# Patient Record
Sex: MALE | Race: AMERICAN INDIAN OR ALASKA NATIVE | HISPANIC OR LATINO | Employment: OTHER | URBAN - METROPOLITAN AREA
[De-identification: names, ages, dates, MRNs, and addresses within clinical notes are randomized per-mention and may not be internally consistent; named-entity substitution may affect disease eponyms.]

---

## 2023-03-06 ENCOUNTER — TELEPHONE (OUTPATIENT)
Dept: HEPATOLOGY | Facility: CLINIC | Age: 43
End: 2023-03-06
Payer: OTHER GOVERNMENT

## 2023-03-06 NOTE — TELEPHONE ENCOUNTER
Attempted to contact patient at [phone] with no answer. Left voicemail message for patient to return call.

## 2023-03-06 NOTE — TELEPHONE ENCOUNTER
----- Message from Rosie Almaguer sent at 3/6/2023  3:12 PM CST -----  Contact: [phone]  Type:  Patient Returning Call    Who Called:Jesus Manuel  Who Left Message for Patient:Kimberly  Does the patient know what this is regarding?:Appointment  Would the patient rather a call back or a response via [facility]? Call back  Best Call Back Number:[phone]  Additional Information:

## 2023-04-05 ENCOUNTER — OFFICE VISIT (OUTPATIENT)
Dept: HEPATOLOGY | Facility: CLINIC | Age: 43
End: 2023-04-05
Payer: OTHER GOVERNMENT

## 2023-04-05 VITALS
HEIGHT: 66 IN | HEART RATE: 69 BPM | DIASTOLIC BLOOD PRESSURE: 70 MMHG | BODY MASS INDEX: 27.8 KG/M2 | SYSTOLIC BLOOD PRESSURE: 112 MMHG | WEIGHT: 173 LBS

## 2023-04-05 DIAGNOSIS — I72.8 HEPATIC ARTERY ANEURYSM: Primary | ICD-10-CM

## 2023-04-05 DIAGNOSIS — R79.89 ABNORMAL LFTS: ICD-10-CM

## 2023-04-05 DIAGNOSIS — K75.0 HEPATIC ABSCESS: ICD-10-CM

## 2023-04-05 PROCEDURE — 99999 PR PBB SHADOW E&M-EST. PATIENT-LVL III: CPT | Mod: PBBFAC,,, | Performed by: INTERNAL MEDICINE

## 2023-04-05 PROCEDURE — 99213 OFFICE O/P EST LOW 20 MIN: CPT | Mod: PBBFAC | Performed by: INTERNAL MEDICINE

## 2023-04-05 PROCEDURE — 99204 PR OFFICE/OUTPT VISIT, NEW, LEVL IV, 45-59 MIN: ICD-10-PCS | Mod: S$PBB,,, | Performed by: INTERNAL MEDICINE

## 2023-04-05 PROCEDURE — 99999 PR PBB SHADOW E&M-EST. PATIENT-LVL III: ICD-10-PCS | Mod: PBBFAC,,, | Performed by: INTERNAL MEDICINE

## 2023-04-05 PROCEDURE — 99204 OFFICE O/P NEW MOD 45 MIN: CPT | Mod: S$PBB,,, | Performed by: INTERNAL MEDICINE

## 2023-04-05 NOTE — PROGRESS NOTES
Subjective:     Jesus Manuel Villareal is here for evaluation of Liver Abcess       HPI  Jesus Manuel Villareal is here for evaluation of abnormal LFTs and recent complications after biopsy.  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  These issues were managed with embolization as well as drainage and antibiotics.  He is currently off all antibiotics.  He reports feeling well.  Denies any major issues.  Only occasional, limited episodic discomfort on the right flank with certain movements.  No issues of fevers or chills.    Outside records reviewed     Path 1/2023  A.  Liver, needle biopsy:   -No histologic abnormality     COMMENT: Sections show multiple cores of benign liver parenchyma measuring in aggregate approximately 38 mm. The lobular architecture is preserved. Portal areas are seen in all cores.   -IRON stain shows no increased ferritin deposition.   -TRICHROME stain shows normal connective tissue with no increased fibrosis.   -RETICULUM stain highlights a normal two cell plate architecture.   -PAS stain shows portal areas to have normal bile ducts and vessels with mild chronic inflammation consisting of small lymphocytes in some portal areas. Other portal areas have only rare lymphocytes. There is no active inflammation or piecemeal necrosis.   -PAS-D shows no evidence of cytoplasmic inclusions.     CT 1/2023  IMPRESSION:   Abnormal appearance of the anterior superior left lobe of the liver,   marked focal fatty infiltration versus traumatic contusion, with a   small amount of high attenuation fluid perihepatic and pelvic,   consistent with minimal hemoperitoneum.  Recommend clinical   correlation.  Consider CT scan with contrast.      Embolization 1/2023  IMPRESSION:   1. Successful coil embolization of an acute vessel injury, likely a   pseudoaneurysm, of the left hepatic artery segment 3 branch.   2. There are tiny bleeding foci from multiple tiny left  hepatic artery   branches.  These are likely related to active dissection associated   with an enlarging hematoma.  They were left untreated directly, as   treating them would necessitate sacrificing the left hepatic lobe.   They should tamponade now that the primary bleeding focus has been   treated.    CT 2/2023  Decreased size of the intrahepatic abscess after drainage catheter placement.    Review of Systems    Objective:     Physical Exam    Computed MELD-Na score unavailable. Necessary lab results were not found in the last year.  Computed MELD score unavailable. Necessary lab results were not found in the last year.    WBC   Date Value Ref Range Status   01/31/2023 10.9 (H) 4.8 - 10.8 K/UL Final     Hemoglobin   Date Value Ref Range Status   01/31/2023 10.4 (L) 14.0 - 17.0 g/dL Final     Hct   Date Value Ref Range Status   01/31/2023 35.5 (L) 41.0 - 50.0 % Final     Platelets   Date Value Ref Range Status   01/31/2023 625 (H) 150 - 400 K/UL Final     BUN   Date Value Ref Range Status   10/04/2022 11 7 - 18 mg/dL Final     Creatinine   Date Value Ref Range Status   10/04/2022 0.83 0.70 - 1.30 mg/dL Final     Calcium   Date Value Ref Range Status   10/04/2022 8.9 8.5 - 10.1 mg/dL Final     Sodium   Date Value Ref Range Status   10/04/2022 138 136 - 145 mmol/L Final     Potassium   Date Value Ref Range Status   10/04/2022 3.9 3.5 - 5.1 mmol/L Final     Chloride   Date Value Ref Range Status   10/04/2022 105 98 - 107 mmol/L Final     Aspartate Aminotransferase   Date Value Ref Range Status   01/22/2023 40 (H) 15 - 37 IU/L Final     Alanine Aminotransferase   Date Value Ref Range Status   01/22/2023 71 (H) 16 - 61 IU/L Final     Alk Phos   Date Value Ref Range Status   01/22/2023 118 (H) 45 - 117 IU/L Final     Albumin Level   Date Value Ref Range Status   01/22/2023 3.2 (L) 3.4 - 5.0 g/dL Final     INR   Date Value Ref Range Status   01/20/2023 1.30 <5.00 INR Final     Comment:     INR Therapeutic Ranges    Standard Dose Therapy 2.0-3.0  High Dose Therapy 2.5-3.5         Assessment/Plan:     1. Hepatic artery aneurysm    2. Hepatic abscess    3. Abnormal LFTs      Jesus Manuel Villareal is a 43 y.o. male withLiver Abcess     Hepatic artery aneurysm-seems stabilized at this point.  Would like to repeat imaging in a few months to re-evaluate.  -     Comprehensive Metabolic Panel; Future; Expected date: 04/05/2023  -     CBC Auto Differential; Future; Expected date: 04/05/2023  -     CT Abdomen With Without Contrast; Future; Expected date: 04/05/2023    Hepatic abscess-seems resolved with completed antibiotic course.  -     Comprehensive Metabolic Panel; Future; Expected date: 04/05/2023  -     CBC Auto Differential; Future; Expected date: 04/05/2023  -     CT Abdomen With Without Contrast; Future; Expected date: 04/05/2023    Abnormal LFTs-will need to monitor.  Patient recently had biopsy for abnormal LFTs; pathology was unremarkable.  May be difficult to assess LFTs in the setting of recent bleeding and abscesses.  Will let things continue to settle down and recover and monitor labs.  Prior to the biopsy there was no evidence of chronic underlying liver disease. Given the issue with the hepatic artery will need to monitor for any biliary complications.    -     Gamma GT; Future; Expected date: 04/05/2023  -     Comprehensive Metabolic Panel; Future; Expected date: 04/05/2023  -     CBC Auto Differential; Future; Expected date: 04/05/2023  -     Hepatitis B Surface Ab, Qualitative; Future; Expected date: 04/05/2023  -     Hepatitis B Surface Antigen; Future; Expected date: 04/05/2023  -     Hepatitis A antibody, IgG; Future; Expected date: 04/05/2023  -     CT Abdomen With Without Contrast; Future; Expected date: 04/05/2023  -     Hepatitis C Antibody; Future; Expected date: 04/05/2023    Return to clinic in 3 months with preclinic labs and imaging      Tiffany Márquez MD

## 2023-08-02 ENCOUNTER — OFFICE VISIT (OUTPATIENT)
Dept: FAMILY MEDICINE | Facility: CLINIC | Age: 43
End: 2023-08-02
Payer: OTHER GOVERNMENT

## 2023-08-02 VITALS
WEIGHT: 168.13 LBS | HEART RATE: 99 BPM | TEMPERATURE: 99 F | BODY MASS INDEX: 27.02 KG/M2 | HEIGHT: 66 IN | DIASTOLIC BLOOD PRESSURE: 68 MMHG | OXYGEN SATURATION: 99 % | SYSTOLIC BLOOD PRESSURE: 110 MMHG

## 2023-08-02 DIAGNOSIS — Z13.220 SCREENING FOR LIPID DISORDERS: ICD-10-CM

## 2023-08-02 DIAGNOSIS — Z00.00 WELLNESS EXAMINATION: Primary | ICD-10-CM

## 2023-08-02 DIAGNOSIS — Z78.9 NONSMOKER: ICD-10-CM

## 2023-08-02 DIAGNOSIS — R79.89 ABNORMAL LFTS: ICD-10-CM

## 2023-08-02 DIAGNOSIS — Z13.31 NEGATIVE DEPRESSION SCREENING: ICD-10-CM

## 2023-08-02 DIAGNOSIS — Z13.1 SCREENING FOR DIABETES MELLITUS: ICD-10-CM

## 2023-08-02 PROBLEM — Z22.7 LATENT TUBERCULOSIS: Status: ACTIVE | Noted: 2023-08-02

## 2023-08-02 PROBLEM — H52.229 REGULAR ASTIGMATISM: Status: ACTIVE | Noted: 2023-08-02

## 2023-08-02 PROBLEM — I72.4 PSEUDOANEURYSM OF FEMORAL ARTERY: Status: ACTIVE | Noted: 2023-02-10

## 2023-08-02 PROBLEM — H81.10 BENIGN PAROXYSMAL VERTIGO, UNSPECIFIED EAR: Status: ACTIVE | Noted: 2023-08-02

## 2023-08-02 PROBLEM — M20.012 MALLET FINGER OF LEFT FINGER(S): Status: ACTIVE | Noted: 2019-04-18

## 2023-08-02 PROCEDURE — 99202 OFFICE O/P NEW SF 15 MIN: CPT | Mod: S$PBB,25,, | Performed by: FAMILY MEDICINE

## 2023-08-02 PROCEDURE — 99386 PREV VISIT NEW AGE 40-64: CPT | Mod: S$PBB,,, | Performed by: FAMILY MEDICINE

## 2023-08-02 PROCEDURE — 99999 PR PBB SHADOW E&M-EST. PATIENT-LVL III: ICD-10-PCS | Mod: PBBFAC,,, | Performed by: FAMILY MEDICINE

## 2023-08-02 PROCEDURE — 99202 PR OFFICE/OUTPT VISIT, NEW, LEVL II, 15-29 MIN: ICD-10-PCS | Mod: S$PBB,25,, | Performed by: FAMILY MEDICINE

## 2023-08-02 PROCEDURE — 99213 OFFICE O/P EST LOW 20 MIN: CPT | Mod: PBBFAC,PN | Performed by: FAMILY MEDICINE

## 2023-08-02 PROCEDURE — 99999 PR PBB SHADOW E&M-EST. PATIENT-LVL III: CPT | Mod: PBBFAC,,, | Performed by: FAMILY MEDICINE

## 2023-08-02 PROCEDURE — 99386 PR PREVENTIVE VISIT,NEW,40-64: ICD-10-PCS | Mod: S$PBB,,, | Performed by: FAMILY MEDICINE

## 2023-08-02 RX ORDER — ALBUTEROL SULFATE 90 UG/1
AEROSOL, METERED RESPIRATORY (INHALATION)
COMMUNITY
Start: 2023-02-07 | End: 2023-10-27

## 2023-08-02 NOTE — PROGRESS NOTES
Subjective     Patient ID: Jesus Manuel Villareal is a 43 y.o. male.    Chief Complaint: Establish Care    Wellness exam and to establish care    Patient was recently active in the .  Has since retired from the .  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  Patient was eventually cleared by General surgery to return to see them on a p.r.n. basis.  He also followed 1 time with hepatology.  Repeat labs and CT was ordered, but patient has not gotten them because he switched insurances within [facility].  Patient here to figure out what he needs to do next.          Review of Systems   Constitutional:  Negative for activity change, appetite change, fatigue and fever.   Respiratory:  Negative for cough, chest tightness, shortness of breath and wheezing.    Cardiovascular:  Negative for chest pain, palpitations, leg swelling and claudication.   Gastrointestinal:  Negative for abdominal pain, constipation and diarrhea.   Psychiatric/Behavioral:  Negative for dysphoric mood, sleep disturbance and suicidal ideas. The patient is not nervous/anxious.           Objective     Physical Exam  Vitals reviewed.   Constitutional:       General: He is not in acute distress.     Appearance: Normal appearance. He is not ill-appearing.   Cardiovascular:      Rate and Rhythm: Normal rate and regular rhythm.      Heart sounds: Normal heart sounds.   Pulmonary:      Effort: Pulmonary effort is normal. No respiratory distress.      Breath sounds: Normal breath sounds.   Abdominal:      General: Bowel sounds are normal.      Palpations: Abdomen is soft.      Tenderness: There is no abdominal tenderness.   Musculoskeletal:         General: No swelling or tenderness.  Normal range of motion.      Cervical back: Neck supple.      Right lower leg: No edema.      Left lower leg: No edema.   Neurological:      General: No focal deficit present.      Mental Status: He is alert and oriented to person, place, and time.   Psychiatric:         Mood and Affect: Mood normal.         Behavior: Behavior normal.         Thought Content: Thought content normal.            Assessment and Plan     1. Wellness examination    2. BMI 27.0-27.9,adult  -     Lipid Panel; Future; Expected date: 08/02/2023  -     Hemoglobin A1C; Future; Expected date: 08/02/2023    3. Screening for lipid disorders  -     Lipid Panel; Future; Expected date: 08/02/2023    4. Screening for diabetes mellitus  -     Hemoglobin A1C; Future; Expected date: 08/02/2023    5. Negative depression screening      Advised patient to schedule CT scan in labs ordered by hepatology.  Advised patient to follow with them to complete workup.  Advised if labs are still elevated and can not be explained from a GI perspective to return to clinic here and we will continue the workup.  We will add on screening labs for patient to obtain when he gets his other labs done.  Risks, benefits, and side effects were discussed with the patient. All questions were answered to the fullest satisfaction of the patient, and pt verbalized understanding and agreement to treatment plan. Pt was to call with any new or worsening symptoms, or present to the ER.         Berta Cedeno MD  Family Medicine Physician   [facility]     This note was created using [employer] voice recognition software that occasionally may misinterpret phrases or words.

## 2023-08-02 NOTE — PATIENT INSTRUCTIONS
[facility] in [place], [place]  Address: [address], [place] [address]  Phone: [phone]    Need to schedule your Ct scan ordered by Dr. Sanchez and labs ordered by Dr. Sanchez and Dr. Cedeno

## 2023-08-21 ENCOUNTER — PATIENT MESSAGE (OUTPATIENT)
Dept: FAMILY MEDICINE | Facility: CLINIC | Age: 43
End: 2023-08-21
Payer: OTHER GOVERNMENT

## 2023-08-21 ENCOUNTER — HOSPITAL ENCOUNTER (OUTPATIENT)
Dept: RADIOLOGY | Facility: HOSPITAL | Age: 43
Discharge: HOME | End: 2023-08-21
Attending: INTERNAL MEDICINE
Payer: OTHER GOVERNMENT

## 2023-08-21 ENCOUNTER — PATIENT MESSAGE (OUTPATIENT)
Dept: HEPATOLOGY | Facility: CLINIC | Age: 43
End: 2023-08-21
Payer: OTHER GOVERNMENT

## 2023-08-21 ENCOUNTER — LAB VISIT (OUTPATIENT)
Dept: LAB | Facility: HOSPITAL | Age: 43
End: 2023-08-21
Attending: FAMILY MEDICINE
Payer: OTHER GOVERNMENT

## 2023-08-21 DIAGNOSIS — R79.89 ABNORMAL LFTS: ICD-10-CM

## 2023-08-21 DIAGNOSIS — I72.8 HEPATIC ARTERY ANEURYSM: ICD-10-CM

## 2023-08-21 DIAGNOSIS — K75.0 HEPATIC ABSCESS: ICD-10-CM

## 2023-08-21 DIAGNOSIS — Z13.1 SCREENING FOR DIABETES MELLITUS: ICD-10-CM

## 2023-08-21 DIAGNOSIS — Z13.220 SCREENING FOR LIPID DISORDERS: ICD-10-CM

## 2023-08-21 LAB
CHOLEST SERPL-MCNC: 181 MG/DL (ref 120–199)
CHOLEST/HDLC SERPL: 6.7 {RATIO} (ref 2–5)
ESTIMATED AVG GLUCOSE: 117 MG/DL (ref 68–131)
HBA1C MFR BLD: 5.7 % (ref 4–5.6)
HDLC SERPL-MCNC: 27 MG/DL (ref 40–75)
HDLC SERPL: 14.9 % (ref 20–50)
LDLC SERPL CALC-MCNC: 93.6 MG/DL (ref 63–159)
NONHDLC SERPL-MCNC: 154 MG/DL
TRIGL SERPL-MCNC: 302 MG/DL (ref 30–150)

## 2023-08-21 PROCEDURE — 36415 COLL VENOUS BLD VENIPUNCTURE: CPT | Performed by: FAMILY MEDICINE

## 2023-08-21 PROCEDURE — 25500020 PHARM REV CODE 255: Performed by: INTERNAL MEDICINE

## 2023-08-21 PROCEDURE — 74170 CT ABD WO CNTRST FLWD CNTRST: CPT | Mod: TC

## 2023-08-21 PROCEDURE — 74170 CT ABD WO CNTRST FLWD CNTRST: CPT | Mod: 26,,, | Performed by: RADIOLOGY

## 2023-08-21 PROCEDURE — A9698 NON-RAD CONTRAST MATERIALNOC: HCPCS | Performed by: INTERNAL MEDICINE

## 2023-08-21 PROCEDURE — 83036 HEMOGLOBIN GLYCOSYLATED A1C: CPT | Performed by: FAMILY MEDICINE

## 2023-08-21 PROCEDURE — 74170 CT ABDOMEN W WO CONTRAST: ICD-10-PCS | Mod: 26,,, | Performed by: RADIOLOGY

## 2023-08-21 PROCEDURE — 80061 LIPID PANEL: CPT | Performed by: FAMILY MEDICINE

## 2023-08-21 RX ADMIN — IOHEXOL 100 ML: 350 INJECTION, SOLUTION INTRAVENOUS at 03:08

## 2023-08-21 RX ADMIN — IOHEXOL 1000 ML: 9 SOLUTION ORAL at 03:08

## 2023-08-22 DIAGNOSIS — E78.2 MIXED HYPERLIPIDEMIA: Primary | ICD-10-CM

## 2023-08-22 DIAGNOSIS — E78.1 HYPERTRIGLYCERIDEMIA: ICD-10-CM

## 2023-08-23 ENCOUNTER — E-CONSULT (OUTPATIENT)
Dept: HEPATOLOGY | Facility: CLINIC | Age: 43
End: 2023-08-23
Payer: OTHER GOVERNMENT

## 2023-08-23 ENCOUNTER — PATIENT MESSAGE (OUTPATIENT)
Dept: FAMILY MEDICINE | Facility: CLINIC | Age: 43
End: 2023-08-23
Payer: OTHER GOVERNMENT

## 2023-08-23 DIAGNOSIS — R16.0 HEPATOMEGALY: Primary | ICD-10-CM

## 2023-08-23 PROCEDURE — 99451 NTRPROF PH1/NTRNET/EHR 5/>: CPT | Mod: S$PBB,,, | Performed by: INTERNAL MEDICINE

## 2023-08-23 PROCEDURE — 99451 PR INTERPROF, PHONE/INTERNET/EHR, CONSULT, >= 5MINS: ICD-10-PCS | Mod: S$PBB,,, | Performed by: INTERNAL MEDICINE

## 2023-08-23 NOTE — CONSULTS
[facility]  Response for E-Consult     Patient Name: Jesus Manuel Villareal  MRN: [id number]  Primary Care Provider: Berta Cedeno MD   Requesting Provider: Berta Cedeno MD  E-Consult to Hepatology Outpatient  Consult performed by: Pankaj Valiente MD  Consult ordered by: Berta Cedeno MD          Recommendation: All Cholesterol medications are safe including statins   He needs control of Triglycerides    Contingency: Check labs 1 week if statins are started and If Liver enzymes rise > 5 ULN  or any increase in bilirubin then stop and switch    Total time of Consultation: 20 minute    I did not speak to the requesting provider verbally about this.     *This eConsult is based on the clinical data available to me and is furnished without benefit of a physical examination. The eConsult will need to be interpreted in light of any clinical issues or changes in patient status not available to me at the time of filing this eConsults. Significant changes in patient condition or level of acuity should result in immediate formal consultation and reevaluation. Please alert me if you have further questions.    Thank you for this eConsult referral.     Pankaj Valiente MD  [facility]

## 2023-10-27 ENCOUNTER — OFFICE VISIT (OUTPATIENT)
Dept: FAMILY MEDICINE | Facility: CLINIC | Age: 43
End: 2023-10-27
Payer: OTHER GOVERNMENT

## 2023-10-27 VITALS
SYSTOLIC BLOOD PRESSURE: 110 MMHG | BODY MASS INDEX: 29.23 KG/M2 | HEIGHT: 66 IN | WEIGHT: 181.88 LBS | DIASTOLIC BLOOD PRESSURE: 78 MMHG | OXYGEN SATURATION: 97 % | HEART RATE: 82 BPM

## 2023-10-27 DIAGNOSIS — I72.8 HEPATIC ARTERY ANEURYSM: Primary | ICD-10-CM

## 2023-10-27 DIAGNOSIS — E78.2 MIXED HYPERLIPIDEMIA: ICD-10-CM
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
## 2023-10-27 DIAGNOSIS — R10.10 PAIN OF UPPER ABDOMEN: ICD-10-CM

## 2023-10-27 DIAGNOSIS — M54.50 ACUTE BILATERAL LOW BACK PAIN WITHOUT SCIATICA: ICD-10-CM

## 2023-10-27 PROCEDURE — 99999 PR PBB SHADOW E&M-EST. PATIENT-LVL III: ICD-10-PCS | Mod: PBBFAC,,, | Performed by: FAMILY MEDICINE

## 2023-10-27 PROCEDURE — 99214 OFFICE O/P EST MOD 30 MIN: CPT | Mod: S$PBB,,, | Performed by: FAMILY MEDICINE

## 2023-10-27 PROCEDURE — 99213 OFFICE O/P EST LOW 20 MIN: CPT | Mod: PBBFAC,PN | Performed by: FAMILY MEDICINE

## 2023-10-27 PROCEDURE — 99214 PR OFFICE/OUTPT VISIT, EST, LEVL IV, 30-39 MIN: ICD-10-PCS | Mod: S$PBB,,, | Performed by: FAMILY MEDICINE

## 2023-10-27 PROCEDURE — 99999 PR PBB SHADOW E&M-EST. PATIENT-LVL III: CPT | Mod: PBBFAC,,, | Performed by: FAMILY MEDICINE

## 2023-10-27 NOTE — PROGRESS NOTES
Subjective     Patient ID: Jesus Manuel Villareal is a 43 y.o. male.    Chief Complaint: Back Pain and Abdominal Pain    Pt states he's still having RUQ pain radiating to the LUQ.  States he is no pain when he presses on the area, but he will get these intermittent sharp pains with movement.  Denies any nausea, vomiting or bowel changes.    Mixed hyperlipidemia: Compliant with medication    Acute on chronic low back pain: b/l lower. States sitting down too long will help with the pain. States he stretches at home sometimes as well. States tried muscle relaxer in past as well which has helped.  States he is open to physical therapy or anything else that he has to do in terms of getting relief    Abdominal Pain  This is a recurrent problem. The current episode started more than 1 month ago. The onset quality is gradual. The problem occurs constantly. The most recent episode lasted 24 hours. The problem has been waxing and waning. The pain is located in the RLQ and epigastric region. The pain is at a severity of 5/10. The pain is moderate. The quality of the pain is dull and sharp. The abdominal pain radiates to the LUQ. Associated symptoms include arthralgias, belching, diarrhea and headaches. Pertinent negatives include no anorexia, constipation, dysuria, fever, flatus, frequency, hematochezia, hematuria, melena, myalgias, nausea, vomiting or weight loss. The pain is aggravated by certain positions. The pain is relieved by Nothing and palpation. He has tried nothing for the symptoms. The treatment provided no relief. Prior diagnostic workup includes CT scan. There is no history of abdominal surgery, colon cancer, Crohn's disease, gallstones, GERD, irritable bowel syndrome, pancreatitis, PUD or ulcerative colitis. Patient's medical history does not include kidney stones and UTI.   Back Pain  Associated symptoms include abdominal pain and headaches. Pertinent negatives include no dysuria, fever or weight loss.     Review of  Systems   Constitutional:  Negative for fever and weight loss.   Gastrointestinal:  Positive for abdominal pain and diarrhea. Negative for anorexia, constipation, flatus, hematochezia, melena, nausea and vomiting.   Genitourinary:  Negative for dysuria, frequency and hematuria.   Musculoskeletal:  Positive for arthralgias and back pain. Negative for myalgias.   Neurological:  Positive for headaches.          Objective     Physical Exam  Vitals reviewed.   Constitutional:       General: He is not in acute distress.     Appearance: Normal appearance. He is not ill-appearing.   Cardiovascular:      Rate and Rhythm: Normal rate and regular rhythm.      Heart sounds: Normal heart sounds.   Pulmonary:      Effort: Pulmonary effort is normal. No respiratory distress.      Breath sounds: Normal breath sounds.   Abdominal:      General: Bowel sounds are normal. There is no distension.      Palpations: Abdomen is soft.      Tenderness: There is no abdominal tenderness.   Musculoskeletal:         General: No swelling or tenderness. Normal range of motion.      Cervical back: Neck supple.      Right lower leg: No edema.      Left lower leg: No edema.   Neurological:      General: No focal deficit present.      Mental Status: He is alert and oriented to person, place, and time.   Psychiatric:         Mood and Affect: Mood normal.         Behavior: Behavior normal.         Thought Content: Thought content normal.            Assessment and Plan     1. Hepatic artery aneurysm  -     Comprehensive Metabolic Panel; Future; Expected date: 10/27/2023  -     CBC Auto Differential; Future; Expected date: 10/27/2023  -     CT Abdomen Pelvis With IV Contrast; Future; Expected date: 10/27/2023    2. Pain of upper abdomen    3. Mixed hyperlipidemia  -     Lipid Panel; Future; Expected date: 10/27/2023    4. Acute bilateral low back pain without sciatica    5. Injury caused by bending, sequela    Will reorder CT abdomen of pelvis.  Recheck  labs.  Advised to keep appointment with hepatology.  We will consider starting muscle relaxer once labs come back as there might be a delay in getting the CT abdomen scheduled. Risks, benefits, and side effects were discussed with the patient. All questions were answered to the fullest satisfaction of the patient, and pt verbalized understanding and agreement to treatment plan. Pt was to call with any new or worsening symptoms, or present to the ER.  RTC pending results.        Berta Cedeno MD  Family Medicine Physician   [facility]     This note was created using M*Modal voice recognition software that occasionally may misinterpret phrases or words.

## 2023-10-31 ENCOUNTER — CLINICAL SUPPORT (OUTPATIENT)
Dept: FAMILY MEDICINE | Facility: CLINIC | Age: 43
End: 2023-10-31
Payer: OTHER GOVERNMENT

## 2023-10-31 DIAGNOSIS — I72.8 HEPATIC ARTERY ANEURYSM: ICD-10-CM

## 2023-10-31 DIAGNOSIS — E78.2 MIXED HYPERLIPIDEMIA: ICD-10-CM

## 2023-10-31 LAB
ALBUMIN SERPL BCP-MCNC: 3.9 G/DL (ref 3.5–5.2)
ALP SERPL-CCNC: 135 U/L (ref 55–135)
ALT SERPL W/O P-5'-P-CCNC: 45 U/L (ref 10–44)
ANION GAP SERPL CALC-SCNC: 9 MMOL/L (ref 8–16)
AST SERPL-CCNC: 24 U/L (ref 10–40)
BASOPHILS # BLD AUTO: 0.02 K/UL (ref 0–0.2)
BASOPHILS NFR BLD: 0.3 % (ref 0–1.9)
BILIRUB SERPL-MCNC: 1.7 MG/DL (ref 0.1–1)
BUN SERPL-MCNC: 11 MG/DL (ref 6–20)
CALCIUM SERPL-MCNC: 9.1 MG/DL (ref 8.7–10.5)
CHLORIDE SERPL-SCNC: 103 MMOL/L (ref 95–110)
CHOLEST SERPL-MCNC: 199 MG/DL (ref 120–199)
CHOLEST/HDLC SERPL: 6.2 {RATIO} (ref 2–5)
CO2 SERPL-SCNC: 26 MMOL/L (ref 23–29)
CREAT SERPL-MCNC: 1 MG/DL (ref 0.5–1.4)
DIFFERENTIAL METHOD: ABNORMAL
EOSINOPHIL # BLD AUTO: 0 K/UL (ref 0–0.5)
EOSINOPHIL NFR BLD: 0.7 % (ref 0–8)
ERYTHROCYTE [DISTWIDTH] IN BLOOD BY AUTOMATED COUNT: 15.2 % (ref 11.5–14.5)
EST. GFR  (NO RACE VARIABLE): >60 ML/MIN/1.73 M^2
GLUCOSE SERPL-MCNC: 116 MG/DL (ref 70–110)
HCT VFR BLD AUTO: 42.3 % (ref 40–54)
HDLC SERPL-MCNC: 32 MG/DL (ref 40–75)
HDLC SERPL: 16.1 % (ref 20–50)
HGB BLD-MCNC: 13.2 G/DL (ref 14–18)
IMM GRANULOCYTES # BLD AUTO: 0.02 K/UL (ref 0–0.04)
IMM GRANULOCYTES NFR BLD AUTO: 0.3 % (ref 0–0.5)
LDLC SERPL CALC-MCNC: ABNORMAL MG/DL (ref 63–159)
LYMPHOCYTES # BLD AUTO: 2.1 K/UL (ref 1–4.8)
LYMPHOCYTES NFR BLD: 33.3 % (ref 18–48)
MCH RBC QN AUTO: 22.2 PG (ref 27–31)
MCHC RBC AUTO-ENTMCNC: 31.2 G/DL (ref 32–36)
MCV RBC AUTO: 71 FL (ref 82–98)
MONOCYTES # BLD AUTO: 0.3 K/UL (ref 0.3–1)
MONOCYTES NFR BLD: 5.2 % (ref 4–15)
NEUTROPHILS # BLD AUTO: 3.7 K/UL (ref 1.8–7.7)
NEUTROPHILS NFR BLD: 60.2 % (ref 38–73)
NONHDLC SERPL-MCNC: 167 MG/DL
NRBC BLD-RTO: 0 /100 WBC
PLATELET # BLD AUTO: 286 K/UL (ref 150–450)
PMV BLD AUTO: 9.8 FL (ref 9.2–12.9)
POTASSIUM SERPL-SCNC: 3.7 MMOL/L (ref 3.5–5.1)
PROT SERPL-MCNC: 7.6 G/DL (ref 6–8.4)
RBC # BLD AUTO: 5.94 M/UL (ref 4.6–6.2)
SODIUM SERPL-SCNC: 138 MMOL/L (ref 136–145)
TRIGL SERPL-MCNC: 510 MG/DL (ref 30–150)
WBC # BLD AUTO: 6.15 K/UL (ref 3.9–12.7)

## 2023-10-31 PROCEDURE — 80061 LIPID PANEL: CPT | Performed by: FAMILY MEDICINE

## 2023-10-31 PROCEDURE — 80053 COMPREHEN METABOLIC PANEL: CPT | Performed by: FAMILY MEDICINE

## 2023-10-31 PROCEDURE — 85025 COMPLETE CBC W/AUTO DIFF WBC: CPT | Performed by: FAMILY MEDICINE

## 2023-11-01 ENCOUNTER — PATIENT MESSAGE (OUTPATIENT)
Dept: FAMILY MEDICINE | Facility: CLINIC | Age: 43
End: 2023-11-01
Payer: OTHER GOVERNMENT

## 2023-11-01 RX ORDER — ROSUVASTATIN CALCIUM 5 MG/1
5 TABLET, COATED ORAL DAILY
Qty: 90 TABLET | Refills: 0 | Status: SHIPPED | OUTPATIENT
Start: 2023-11-01 | End: 2024-02-09

## 2023-11-08 ENCOUNTER — HOSPITAL ENCOUNTER (OUTPATIENT)
Dept: RADIOLOGY | Facility: HOSPITAL | Age: 43
Discharge: HOME | End: 2023-11-08
Attending: FAMILY MEDICINE
Payer: OTHER GOVERNMENT

## 2023-11-08 DIAGNOSIS — E78.2 MIXED HYPERLIPIDEMIA: Primary | ICD-10-CM

## 2023-11-08 DIAGNOSIS — I72.8 HEPATIC ARTERY ANEURYSM: ICD-10-CM

## 2023-11-08 PROCEDURE — 74177 CT ABD & PELVIS W/CONTRAST: CPT | Mod: TC

## 2023-11-08 PROCEDURE — 74177 CT ABD & PELVIS W/CONTRAST: CPT | Mod: 26,,, | Performed by: RADIOLOGY

## 2023-11-08 PROCEDURE — 74177 CT ABDOMEN PELVIS WITH IV CONTRAST: ICD-10-PCS | Mod: 26,,, | Performed by: RADIOLOGY

## 2023-11-08 PROCEDURE — 25500020 PHARM REV CODE 255: Performed by: FAMILY MEDICINE

## 2023-11-08 PROCEDURE — A9698 NON-RAD CONTRAST MATERIALNOC: HCPCS | Performed by: FAMILY MEDICINE

## 2023-11-08 RX ADMIN — IOHEXOL 75 ML: 350 INJECTION, SOLUTION INTRAVENOUS at 12:11

## 2023-11-08 RX ADMIN — IOHEXOL 1000 ML: 9 SOLUTION ORAL at 12:11

## 2023-11-21 ENCOUNTER — CLINICAL SUPPORT (OUTPATIENT)
Dept: FAMILY MEDICINE | Facility: CLINIC | Age: 43
End: 2023-11-21
Payer: OTHER GOVERNMENT

## 2023-11-21 DIAGNOSIS — E78.2 MIXED HYPERLIPIDEMIA: ICD-10-CM

## 2023-11-21 LAB
ALBUMIN SERPL BCP-MCNC: 4.3 G/DL (ref 3.5–5.2)
ALP SERPL-CCNC: 137 U/L (ref 55–135)
ALT SERPL W/O P-5'-P-CCNC: 45 U/L (ref 10–44)
ANION GAP SERPL CALC-SCNC: 9 MMOL/L (ref 8–16)
AST SERPL-CCNC: 30 U/L (ref 10–40)
BILIRUB SERPL-MCNC: 2 MG/DL (ref 0.1–1)
BUN SERPL-MCNC: 11 MG/DL (ref 6–20)
CALCIUM SERPL-MCNC: 9.3 MG/DL (ref 8.7–10.5)
CHLORIDE SERPL-SCNC: 102 MMOL/L (ref 95–110)
CO2 SERPL-SCNC: 26 MMOL/L (ref 23–29)
CREAT SERPL-MCNC: 1 MG/DL (ref 0.5–1.4)
EST. GFR  (NO RACE VARIABLE): >60 ML/MIN/1.73 M^2
GLUCOSE SERPL-MCNC: 109 MG/DL (ref 70–110)
POTASSIUM SERPL-SCNC: 4.1 MMOL/L (ref 3.5–5.1)
PROT SERPL-MCNC: 7.9 G/DL (ref 6–8.4)
SODIUM SERPL-SCNC: 137 MMOL/L (ref 136–145)

## 2023-11-21 PROCEDURE — 80053 COMPREHEN METABOLIC PANEL: CPT | Performed by: FAMILY MEDICINE

## 2024-02-05 ENCOUNTER — OFFICE VISIT (OUTPATIENT)
Dept: FAMILY MEDICINE | Facility: CLINIC | Age: 44
End: 2024-02-05
Payer: OTHER GOVERNMENT

## 2024-02-05 VITALS
HEART RATE: 71 BPM | DIASTOLIC BLOOD PRESSURE: 78 MMHG | OXYGEN SATURATION: 99 % | WEIGHT: 179.13 LBS | BODY MASS INDEX: 28.79 KG/M2 | SYSTOLIC BLOOD PRESSURE: 114 MMHG | HEIGHT: 66 IN

## 2024-02-05 DIAGNOSIS — R06.83 SNORING: ICD-10-CM

## 2024-02-05 DIAGNOSIS — H04.123 DRY EYES, BILATERAL: Primary | ICD-10-CM
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
## 2024-02-05 DIAGNOSIS — R06.81 WITNESSED EPISODE OF APNEA: ICD-10-CM

## 2024-02-05 DIAGNOSIS — R53.83 FATIGUE, UNSPECIFIED TYPE: ICD-10-CM

## 2024-02-05 DIAGNOSIS — M65.30 TRIGGER FINGER OF RIGHT HAND, UNSPECIFIED FINGER: ICD-10-CM

## 2024-02-05 DIAGNOSIS — M19.041 ARTHRITIS OF BOTH HANDS: ICD-10-CM

## 2024-02-05 DIAGNOSIS — R79.89 ABNORMAL LFTS: ICD-10-CM

## 2024-02-05 DIAGNOSIS — M19.042 ARTHRITIS OF BOTH HANDS: ICD-10-CM

## 2024-02-05 PROCEDURE — 99214 OFFICE O/P EST MOD 30 MIN: CPT | Mod: S$PBB,,, | Performed by: FAMILY MEDICINE

## 2024-02-05 PROCEDURE — 99999 PR PBB SHADOW E&M-EST. PATIENT-LVL III: CPT | Mod: PBBFAC,,, | Performed by: FAMILY MEDICINE

## 2024-02-05 PROCEDURE — 99213 OFFICE O/P EST LOW 20 MIN: CPT | Mod: PBBFAC,PN | Performed by: FAMILY MEDICINE

## 2024-02-05 NOTE — PROGRESS NOTES
Subjective     Patient ID: Jesus Manuel Villareal is a 43 y.o. male.    Chief Complaint: Eye Problem    Pt states he had eye surgery in past and has suffered from dry eyes. Lately eye have been feeling dry and he's been having some blurry vision. Pt states he's not follow up with ophthalmology yet.     Pt states that every morning his index finger and middle finger are stiff in the morning. States his hands hurt with repetitive use. States it hurst. States he was diagnosed with trigger finger and stent. Pt needs diagnosis associated with his hands so that that  will take his symptoms seriously.  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 Pt is right handed, but denies any repetitive use of his hands. Sates symptoms were present and have still been present since the . States they placed him on medications in past.     Pt also states his wife states that he stops breathing at times when he sleep. States he snores as well. Pt states he has woken up gasping for air and feels tired in the morning.     Fatty liver/elevated LFT's/HLD & pre-diabetes: pt taking lipid medication, but not fasting today so doesn't want labs drawn.       Review of Systems   Constitutional:  Negative for activity change and unexpected weight change.   HENT:  Negative for hearing loss, rhinorrhea and trouble swallowing.    Eyes:  Positive for visual disturbance and eye dryness. Negative for discharge.   Respiratory:  Negative for chest tightness and wheezing.    Cardiovascular:  Negative for chest pain and palpitations.   Gastrointestinal:  Negative for blood in stool, constipation, diarrhea and vomiting.   Endocrine: Negative for polydipsia and polyuria.   Genitourinary:  Negative for difficulty urinating, hematuria and urgency.   Musculoskeletal:  Positive for arthralgias and joint swelling. Negative for neck pain.   Neurological:  Positive for weakness and headaches.   Psychiatric/Behavioral:  Negative for confusion and dysphoric mood.            Objective     Physical Exam  Vitals reviewed.   Constitutional:       General: He is not in acute distress.     Appearance: Normal appearance. He is not ill-appearing.   Eyes:      General:         Right eye: No discharge.         Left eye: No discharge.      Conjunctiva/sclera: Conjunctivae normal.      Pupils: Pupils are equal, round, and reactive to light.   Cardiovascular:      Rate and Rhythm: Normal rate and regular rhythm.      Heart sounds: Normal heart sounds.   Pulmonary:      Effort: Pulmonary effort is normal. No respiratory distress.      Breath sounds: Normal breath sounds.   Musculoskeletal:         General: No swelling or tenderness. Normal range of motion.      Cervical back: Neck supple.      Right lower leg: No edema.      Left lower leg: No edema.   Neurological:      General: No focal deficit present.      Mental Status: He is alert and oriented to person, place, and time.      Motor: No weakness.      Comments: Negative Phalen and tinel's b/l.    Psychiatric:         Mood and Affect: Mood normal.         Behavior: Behavior normal.         Thought Content: Thought content normal.            Assessment and Plan     1. Dry eyes, bilateral    2. Abnormal LFTs    3. Prediabetes    4. Arthritis of both hands    5. Trigger finger of right hand, unspecified finger    6. Injury caused by bending, initial encounter      Pt declined x-ray of his hands. He states if he get a diagnosis then the  will do their own workup of his symptoms. Advised pt that is does sound like he has osteoarthritis of his hands and trigger finder. Advised that he should consider PT and NSAIDs in low amount to see if that helps. Discussed also referral to ortho for injections for trigger finger. Pt to consider.   Will place referral for sleep study  Advised to follow with eye doctor  Advised on need for labs in future for chronic conditions.  Risks, benefits, and side effects were discussed with the patient. All  questions were answered to the fullest satisfaction of the patient, and pt verbalized understanding and agreement to treatment plan. Pt was to call with any new or worsening symptoms, or present to the ER.  RTC 1 month        Berta Cedeno MD  Family Medicine Physician   [facility]     This note was created using [employer] voice recognition software that occasionally may misinterpret phrases or words.

## 2024-02-06 ENCOUNTER — PATIENT MESSAGE (OUTPATIENT)
Dept: ADMINISTRATIVE | Facility: OTHER | Age: 44
End: 2024-02-06
Payer: OTHER GOVERNMENT

## 2024-02-09 RX ORDER — ROSUVASTATIN CALCIUM 5 MG/1
5 TABLET, COATED ORAL DAILY
Qty: 90 TABLET | Refills: 2 | Status: SHIPPED | OUTPATIENT
Start: 2024-02-09

## 2024-02-14 ENCOUNTER — TELEPHONE (OUTPATIENT)
Dept: FAMILY MEDICINE | Facility: CLINIC | Age: 44
End: 2024-02-14
Payer: OTHER GOVERNMENT

## 2024-02-14 NOTE — TELEPHONE ENCOUNTER
Ana Kumar,  Please review this message regarding this patient for signed clinical notes to be faxed to Kristen/[facility].  Thank you.  Signed:  Jame Sanabria LPN  ----- Message from Sally Buchanan sent at 2/14/2024  2:45 PM CST -----  Contact: Kristen  Type: Needs Medical Advice    Who Called: Kristen/ [facility]  Best Call Back Number: [phone]  Additional  Information: Requesting signed clinic notes from most recent visit faxed to FAX# [phone]  Please Advise- Thank you

## 2024-07-24 ENCOUNTER — PATIENT MESSAGE (OUTPATIENT)
Dept: HEPATOLOGY | Facility: CLINIC | Age: 44
End: 2024-07-24
Payer: OTHER GOVERNMENT

## 2024-07-25 ENCOUNTER — OFFICE VISIT (OUTPATIENT)
Dept: FAMILY MEDICINE | Facility: CLINIC | Age: 44
End: 2024-07-25
Payer: OTHER GOVERNMENT

## 2024-07-25 VITALS
HEIGHT: 66 IN | DIASTOLIC BLOOD PRESSURE: 80 MMHG | WEIGHT: 184.75 LBS | HEART RATE: 73 BPM | BODY MASS INDEX: 29.69 KG/M2 | OXYGEN SATURATION: 98 % | SYSTOLIC BLOOD PRESSURE: 112 MMHG

## 2024-07-25 DIAGNOSIS — H01.119 CONTACT DERMATITIS OF EYELID, UNSPECIFIED LATERALITY: Primary | ICD-10-CM

## 2024-07-25 DIAGNOSIS — G43.009 MIGRAINE WITHOUT AURA AND WITHOUT STATUS MIGRAINOSUS, NOT INTRACTABLE: ICD-10-CM

## 2024-07-25 DIAGNOSIS — R79.89 ABNORMAL LFTS: ICD-10-CM

## 2024-07-25 PROCEDURE — 99213 OFFICE O/P EST LOW 20 MIN: CPT | Mod: PBBFAC,PN | Performed by: FAMILY MEDICINE

## 2024-07-25 PROCEDURE — 99213 OFFICE O/P EST LOW 20 MIN: CPT | Mod: S$PBB,,, | Performed by: FAMILY MEDICINE

## 2024-07-25 PROCEDURE — 99999 PR PBB SHADOW E&M-EST. PATIENT-LVL III: CPT | Mod: PBBFAC,,, | Performed by: FAMILY MEDICINE

## 2024-07-25 RX ORDER — CETIRIZINE HYDROCHLORIDE 10 MG/1
10 TABLET ORAL DAILY
Qty: 30 TABLET | Refills: 2 | Status: SHIPPED | OUTPATIENT
Start: 2024-07-25 | End: 2025-07-25

## 2024-07-25 RX ORDER — ASPIRIN 325 MG
1250 TABLET, DELAYED RELEASE (ENTERIC COATED) ORAL
COMMUNITY
Start: 2024-03-29

## 2024-07-25 RX ORDER — SUMATRIPTAN 50 MG/1
TABLET, FILM COATED ORAL
Qty: 18 TABLET | Refills: 5 | Status: SHIPPED | OUTPATIENT
Start: 2024-07-25

## 2024-07-25 RX ORDER — TRIAMCINOLONE ACETONIDE 0.25 MG/G
CREAM TOPICAL 2 TIMES DAILY
Qty: 15 G | Refills: 3 | Status: SHIPPED | OUTPATIENT
Start: 2024-07-25

## 2024-07-25 NOTE — PROGRESS NOTES
Subjective     Patient ID: Jesus Manuel Villareal is a 44 y.o. male.    Chief Complaint: Itching (Under SAIRA eyes, redness, peeling skin, swelling) and Headache    Red Itchy rash on bottom of both eyelids, mild swelling. Washing face more often. States symptoms come and go now for about 4 wks. States he had it in the past, but now it's coming back. No allergy symptoms.     [facility]. Was also diagnosed with vitamin D deficiency.     HLD: still taking lipid pill    Will have [facility] up [facility], but pt might have to reschedule.     BRYANT: on CPAP and doing much better.     Migraines: states he's been told it's due to his TMJ. Usually gets one bad one a month. Has to sit in a dark room, nausea and vomiting. Will last the whole day. Will take Ibuprofen, but it doesn't help for the major ones. Taking ibuprofen daily for OA hands, but will still have migraine. Pt states imitrex helped in past.       Review of Systems   Constitutional:  Positive for activity change. Negative for unexpected weight change.   HENT:  Negative for hearing loss, rhinorrhea and trouble swallowing.    Eyes:  Negative for discharge and visual disturbance.   Respiratory:  Negative for chest tightness and wheezing.    Cardiovascular:  Negative for chest pain and palpitations.   Gastrointestinal:  Negative for blood in stool, constipation, diarrhea and vomiting.   Endocrine: Negative for polydipsia and polyuria.   Genitourinary:  Negative for difficulty urinating, hematuria and urgency.   Musculoskeletal:  Positive for arthralgias. Negative for joint swelling and neck pain.   Neurological:  Positive for headaches.   Psychiatric/Behavioral:  Negative for dysphoric mood.           Objective     Physical Exam  Vitals reviewed.   Constitutional:       General: He is not in acute distress.     Appearance: Normal appearance. He is not ill-appearing.   Eyes:        Comments: Mild erythema and flaking to skin right be  low lower eyelid.  Eyelid has mild swelling, but no crusting or discharge noted   Cardiovascular:      Rate and Rhythm: Normal rate and regular rhythm.      Heart sounds: Normal heart sounds.   Pulmonary:      Effort: Pulmonary effort is normal. No respiratory distress.      Breath sounds: Normal breath sounds.   Abdominal:      General: Bowel sounds are normal.      Palpations: Abdomen is soft.      Tenderness: There is no abdominal tenderness.   Musculoskeletal:         General: No swelling or tenderness. Normal range of motion.      Cervical back: Neck supple.      Right lower leg: No edema.      Left lower leg: No edema.   Neurological:      General: No focal deficit present.      Mental Status: He is alert and oriented to person, place, and time.   Psychiatric:         Mood and Affect: Mood normal.         Behavior: Behavior normal.         Thought Content: Thought content normal.            Assessment and Plan     1. Contact dermatitis of eyelid, unspecified laterality    2. Abnormal LFTs    Other orders  -     cetirizine (ZYRTEC) 10 MG tablet; Take 1 tablet (10 mg total) by mouth once daily.  Dispense: 30 tablet; Refill: 2  -     triamcinolone acetonide 0.025% (KENALOG) 0.025 % cream; Apply topically 2 (two) times daily. To rash under skin. Stop when rash goes away.  Dispense: 15 g; Refill: 3      Trial of allergy medication and hydrocortisone cream to skin underneath eye.  Advised to use a hydrating ointment like Aquaphor or Vaseline and sunscreen to face as well.  [facility]  Trial of Imitrex for severe migraine  Risks, benefits, and side effects were discussed with the patient. All questions were answered to the fullest satisfaction of the patient, and pt verbalized understanding and agreement to treatment plan. Pt was to call with any new or worsening symptoms, or present to the ER.         Berta Cedeno MD  Family Medicine Physician   [facility]     This note was  created using [employer] voice recognition software that occasionally may misinterpret phrases or words.

## 2025-01-28 ENCOUNTER — OFFICE VISIT (OUTPATIENT)
Dept: FAMILY MEDICINE | Facility: CLINIC | Age: 45
End: 2025-01-28
Payer: OTHER GOVERNMENT

## 2025-01-28 VITALS
DIASTOLIC BLOOD PRESSURE: 70 MMHG | HEIGHT: 66 IN | OXYGEN SATURATION: 97 % | BODY MASS INDEX: 28.44 KG/M2 | WEIGHT: 176.94 LBS | SYSTOLIC BLOOD PRESSURE: 108 MMHG | HEART RATE: 80 BPM

## 2025-01-28 DIAGNOSIS — H01.002 BLEPHARITIS OF LOWER EYELIDS OF BOTH EYES, UNSPECIFIED TYPE: Primary | ICD-10-CM

## 2025-01-28 DIAGNOSIS — H01.005 BLEPHARITIS OF LOWER EYELIDS OF BOTH EYES, UNSPECIFIED TYPE: Primary | ICD-10-CM

## 2025-01-28 DIAGNOSIS — G47.33 OSA (OBSTRUCTIVE SLEEP APNEA): ICD-10-CM

## 2025-01-28 DIAGNOSIS — Z78.9 NONSMOKER: ICD-10-CM

## 2025-01-28 PROCEDURE — 99214 OFFICE O/P EST MOD 30 MIN: CPT | Mod: S$PBB,,, | Performed by: FAMILY MEDICINE

## 2025-01-28 PROCEDURE — 99213 OFFICE O/P EST LOW 20 MIN: CPT | Mod: PBBFAC,PN | Performed by: FAMILY MEDICINE

## 2025-01-28 PROCEDURE — 99999 PR PBB SHADOW E&M-EST. PATIENT-LVL III: CPT | Mod: PBBFAC,,, | Performed by: FAMILY MEDICINE

## 2025-01-28 RX ORDER — MULTIVITAMIN WITH IRON
TABLET ORAL DAILY
COMMUNITY

## 2025-01-28 NOTE — PROGRESS NOTES
Subjective     Patient ID: Jesus Manuel Villareal is a 44 y.o. male.    Chief Complaint: Under eyelid concern (Burning, itching, swelling, sensitive to touch and redness on SAIRA lower eyelids) and Sleep Apnea    HPI  Review of Systems       Objective     Physical Exam       Assessment and Plan     {There are no diagnoses linked to this encounter. (Refresh or delete this SmartLink)}    ***         No follow-ups on file.

## 2025-01-28 NOTE — PROGRESS NOTES
Subjective     Patient ID: Jesus Manuel Villareal is a 44 y.o. male.    Chief Complaint: Under eyelid concern (Burning, itching, swelling, sensitive to touch and redness on SAIRA lower eyelids) and Sleep Apnea    States the bottom of his eyelids become red and swollen. Saw his eye doctor 3 wks ago for a different reason, but didn't mention this to them. States he does have dry eyes, using otc eye drops.     BRYANT: states he did sleep study is wearing the machine and is not waking up snoring. Pt states he wants to know what's the causes behind it. States he didn't have these problems until after his hospitalization, but no intubation.       Review of Systems   Constitutional:  Negative for activity change, appetite change, fatigue and fever.   Eyes:  Positive for itching.   Respiratory:  Negative for cough, chest tightness, shortness of breath and wheezing.    Cardiovascular:  Negative for chest pain, palpitations, leg swelling and claudication.   Gastrointestinal:  Negative for abdominal pain, constipation and diarrhea.   Psychiatric/Behavioral:  Negative for dysphoric mood, sleep disturbance and suicidal ideas. The patient is not nervous/anxious.           Objective     Physical Exam  Vitals reviewed.   Constitutional:       General: He is not in acute distress.     Appearance: He is not ill-appearing.   Eyes:      General: No scleral icterus.     Extraocular Movements: Extraocular movements intact.      Conjunctiva/sclera: Conjunctivae normal.      Pupils: Pupils are equal, round, and reactive to light.   Cardiovascular:      Rate and Rhythm: Normal rate.   Pulmonary:      Effort: Pulmonary effort is normal. No respiratory distress.   Neurological:      General: No focal deficit present.      Mental Status: He is alert and oriented to person, place, and time.   Psychiatric:         Mood and Affect: Mood normal.         Behavior: Behavior normal.         Thought Content: Thought content normal.            Assessment and Plan      1. Blepharitis of lower eyelids of both eyes, unspecified type    2. BRYANT (obstructive sleep apnea)    3. Nonsmoker      Spent 30 minutes talking to patient.  Advised that he should follow up with his eye doctor to see if his symptoms are related to dry eye into see if they can give him a prescription lubricant or even possibly steroid eyedrops if needed.  Advised that it is going to most likely be a little difficult to pinpoint exactly the cause behind his sleep apnea especially if it relates to his liver complications.  Advised that if his eye doctor states there is evidence of allergies enlarged tonsils and adenoids can also be a cause for obstructive sleep apnea as well as nasal congestion and a focus should be on appropriately treating his allergies to see if that helps.  Advised that he could possibly follow up with ear nose and throat in the future if needed.   All questions were answered to the fullest satisfaction of the patient, and pt verbalized understanding and agreement to treatment plan. Pt was to call with any new or worsening symptoms, or present to the ER.         Berta Cedeno MD  Family Medicine Physician   [facility]     This note was created using [employer] voice recognition software that occasionally may misinterpret phrases or words.

## 2025-08-14 ENCOUNTER — OFFICE VISIT (OUTPATIENT)
Dept: FAMILY MEDICINE | Facility: CLINIC | Age: 45
End: 2025-08-14
Payer: OTHER GOVERNMENT

## 2025-08-14 VITALS
SYSTOLIC BLOOD PRESSURE: 110 MMHG | OXYGEN SATURATION: 98 % | DIASTOLIC BLOOD PRESSURE: 70 MMHG | HEART RATE: 74 BPM | BODY MASS INDEX: 29.35 KG/M2 | WEIGHT: 182.63 LBS | HEIGHT: 66 IN

## 2025-08-14 DIAGNOSIS — M25.531 ACUTE PAIN OF RIGHT WRIST: Primary | ICD-10-CM
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
## 2025-08-14 PROCEDURE — 99213 OFFICE O/P EST LOW 20 MIN: CPT | Mod: S$PBB,,, | Performed by: FAMILY MEDICINE

## 2025-08-14 PROCEDURE — 99999 PR PBB SHADOW E&M-EST. PATIENT-LVL III: CPT | Mod: PBBFAC,,, | Performed by: FAMILY MEDICINE

## 2025-08-14 PROCEDURE — 99213 OFFICE O/P EST LOW 20 MIN: CPT | Mod: PBBFAC,PN | Performed by: FAMILY MEDICINE
